# Patient Record
Sex: MALE | Race: WHITE | NOT HISPANIC OR LATINO | Employment: FULL TIME | URBAN - METROPOLITAN AREA
[De-identification: names, ages, dates, MRNs, and addresses within clinical notes are randomized per-mention and may not be internally consistent; named-entity substitution may affect disease eponyms.]

---

## 2024-09-13 ENCOUNTER — OFFICE VISIT (OUTPATIENT)
Dept: URGENT CARE | Age: 59
End: 2024-09-13
Payer: COMMERCIAL

## 2024-09-13 VITALS
DIASTOLIC BLOOD PRESSURE: 75 MMHG | SYSTOLIC BLOOD PRESSURE: 106 MMHG | OXYGEN SATURATION: 95 % | RESPIRATION RATE: 16 BRPM | HEART RATE: 60 BPM

## 2024-09-13 DIAGNOSIS — L23.7 ALLERGIC CONTACT DERMATITIS DUE TO PLANTS, EXCEPT FOOD: Primary | ICD-10-CM

## 2024-09-13 RX ORDER — TRIAMCINOLONE ACETONIDE 1 MG/G
CREAM TOPICAL
Qty: 30 G | Refills: 0 | Status: SHIPPED | OUTPATIENT
Start: 2024-09-13 | End: 2024-11-12

## 2024-09-13 RX ORDER — PREDNISONE 10 MG/1
TABLET ORAL
Qty: 42 TABLET | Refills: 0 | Status: SHIPPED | OUTPATIENT
Start: 2024-09-13 | End: 2024-09-25

## 2024-09-13 NOTE — PATIENT INSTRUCTIONS
Allergic contact dermatitis to plants/poison ivy (RHUS Dermatitis)    PLAN:  Symptomatic Treatment (sooth and dry weeping lesions)    1. You can try a cool compresses for 15-20 minutes per hour as needed.  2. Use topical steroid as directed. Steroid cream.  Do not apply to thin- skinned areas such as the eyelids or face.  3. You can try Calamine lotion applied several times per day  4. Take prednisone as prescribed.  5. Follow up with PCP in 7-10 days for reevaluation if no improvement.  6. Return to emergency department or urgent care if new or worsening                    symptoms    You can use Burow's Solution over the lesions  You can take Antihistamine for itch such as Benadryl, or Zyrtec/Claritin    You were seen for Allergic Contact dermatitis´s. This type of dermatitis is a reaction by the body's immune system to a substance contacting the skin. When the skin first comes into contact with the substance, the skin becomes sensitized to that substance. Sometimes a person can be sensitized by only one exposure, and other times sensitization occurs only after many exposures to a substance. After a person is sensitized, the next exposure causes intense itching and dermatitis within 4 to 24 hours, although some people, particularly older people, do not develop a reaction for 3 to 4 days. Thousands of substances can result in allergic contact dermatitis. The most common include substances found inPlants (such as poison ivy)Rubber (including latex) Antibiotics, Fragrances, Preservatives, Some metals (such as nickel and cobalt)    Poison Ivy Dermatitis  About 50 to 70% of people are sensitive to the plant oil urushiol contained in poison ivy, poison oak, and poison sumac. Similar oils are also present in the shells of cashew nuts; the leaves, sap, and fruit skin of the stefanie; and Japanese lacquer. Once a person has been sensitized by contact with these oils, subsequent exposure causes a contact dermatitis. The oils  are quickly absorbed into the skin but may remain active on clothing, tools, and pet fur for long periods of time. Smoke from burning plants also contains the oil and may cause a reaction in certain people. Symptoms of poison ivy dermatitis begin 8 to 48 hours after contact and consist of intense itching, a red rash, and multiple blisters, which may be tiny or very large. Typically, the blisters occur in a straight line following the track where the plant brushed along the skin. The rash may appear at different times in different locations either because of repeat contact with contaminated clothing and other objects or because some parts of the skin are more sensitive than others. The blister fluid itself is not contagious. The itching and rash last for 2 to 3 weeks. Recognition and avoidance of contact with the plants is the best prevention. A number of commercial barrier creams and lotions can be applied before exposure to minimize, but not completely prevent, absorption of oil by the skin. The oil can soak through latex rubber gloves. Washing the skin with soap and water prevents absorption of the oil if done immediately. Stronger solvents, such as acetone, alcohol, and various commercial products, are probably no more effective. Desensitization with various shots or pills or by eating poison ivy leaves is not effective. Treatment of poison ivy dermatitis helps relieve symptoms but does not shorten the duration of the rash. The most effective treatment is with corticosteroids. Small areas of rash are treated with strong topical corticosteroids (drugs applied to the skin), such as triamcinolone, clobetasol, or diflorasone-except on the face and genitals, where only mild corticosteroids, such as 1% hydrocortisone, should be applied. People with large areas of rash or significant facial swelling are given high-dose corticosteroids taken by mouth. Cool compresses wet with water or aluminum acetate may be used on  large blistered areas. Antihistamines given by mouth may help with itching. Lotions and creams containing antihistamines are seldom used.

## 2024-09-13 NOTE — PROGRESS NOTES
Subjective   Patient ID: Morgan Sullivan is a 59 y.o. male. They present today with a chief complaint of Rash.    History of Present Illness  HPI    CC: Rash    HPI: Patient presenting for pruritic rash of his upper right extremity and lower bilateral extremities.  Symptoms started a few days ago after clearing brush out of his yard.  No lip or tongue swelling.  No nausea or vomiting.  No open wounds or streaking redness.  No pain.  No weeping or discharge.  No recent chemical exposures.  No recent antibiotic use.    Past Medical History  Allergies as of 09/13/2024    (No Known Allergies)       (Not in a hospital admission)         History reviewed. No pertinent past medical history.    Past Surgical History:   Procedure Laterality Date    TONSILLECTOMY  10/02/2015    Tonsillectomy        reports that he has never smoked. He has never used smokeless tobacco.    Review of Systems  Review of Systems      After reviewing all body systems I have documented pertinent findings above in the history.  All other Systems reviewed and are negative for complaint.  Pertinent positive and negatives are listed in the above HPI.        Objective    Vitals:    09/13/24 1228   BP: 106/75   BP Location: Right arm   Pulse: 60   Resp: 16   SpO2: 95%     No LMP for male patient.    Physical Exam    General: Alert, oriented, and cooperative.  No acute distress.  No tripoding, nasal flaring, drooling, or stridor. Well developed, well nourished.     Skin: Positive for maculopapular rash in a linear formation localized over the right arm and bilateral lower extremities.  Skin is warm, and dry.  Nailbeds pink with no cyanosis or clubbing. No busies, purpura or petechiae. No lymphangitis, angioedema, edema, erythema, warmth, pain, necrosis, bullae, or modeling. No fluctuance, induration or crepitus. Negative Nikolsky sign. No pain out of proportion.     Eyes: Sclera and conjunctivae normal     Mouth/Throat: No respiratory compromise,  tripoding, drooling, muffled voice, stridor, or trismus.  No strawberry tongue.  No angioedema of the lips or tongue.    Cardiac: Regular rate    Respiratory:  No acute respiratory distress.  Regular rate of breathing.      Abdomen: Bowel sounds are normal.  No distention.  Soft, nontender.        Procedures    Point of Care Test & Imaging Results from this visit  No results found for this or any previous visit.  No results found.    Diagnostic study results (if any) were reviewed by Brijesh Deras PA-C.    Assessment/Plan   Allergies, medications, history, and pertinent labs/EKGs/Imaging reviewed by Brijesh Deras PA-C.       MDM:  Presenting for a nonpainful, pruritic rash.      Patient overall looks well.    Does not appear systemically ill or toxic.   Findings positive for a papulovesicular rash in a linear formation.   No evidence of cellulitis, abscess, or ocular involvement. Viral xanthem and medication reaction are unlikely. Based on above history and exam findings the most likely diagnosis is allergic contact dermatitis to poison ivy / rhus dermatitis.   Patient treated with steroids as outpatient therapy.   Counseled patient/family of the diagnosis and advised close follow up with PCP in 4-7 days.     Pt/family instructed to return to the ED if symptoms worsen or if new symptoms develop.         Medical Admin Record      Follow Up Instructions  No follow-ups on file.        Patient disposition: Home    Electronically signed by Brijesh Deras PA-C  12:49 PM

## 2025-07-12 ENCOUNTER — OFFICE VISIT (OUTPATIENT)
Dept: URGENT CARE | Age: 60
End: 2025-07-12
Payer: COMMERCIAL

## 2025-07-12 VITALS
HEART RATE: 70 BPM | RESPIRATION RATE: 16 BRPM | TEMPERATURE: 97.6 F | OXYGEN SATURATION: 98 % | SYSTOLIC BLOOD PRESSURE: 117 MMHG | DIASTOLIC BLOOD PRESSURE: 85 MMHG

## 2025-07-12 DIAGNOSIS — L23.9 ALLERGIC CONTACT DERMATITIS, UNSPECIFIED TRIGGER: Primary | ICD-10-CM

## 2025-07-12 PROCEDURE — 99070 SPECIAL SUPPLIES PHYS/QHP: CPT | Performed by: STUDENT IN AN ORGANIZED HEALTH CARE EDUCATION/TRAINING PROGRAM

## 2025-07-12 PROCEDURE — 99213 OFFICE O/P EST LOW 20 MIN: CPT | Performed by: STUDENT IN AN ORGANIZED HEALTH CARE EDUCATION/TRAINING PROGRAM

## 2025-07-12 RX ORDER — METHYLPREDNISOLONE 4 MG/1
TABLET ORAL
Qty: 21 TABLET | Refills: 0 | Status: SHIPPED | OUTPATIENT
Start: 2025-07-12 | End: 2025-07-18

## 2025-07-12 RX ORDER — TAMSULOSIN HYDROCHLORIDE 0.4 MG/1
0.4 CAPSULE ORAL DAILY
COMMUNITY

## 2025-07-12 RX ORDER — ESCITALOPRAM OXALATE 5 MG/5ML
10 SOLUTION ORAL DAILY
COMMUNITY

## 2025-07-12 RX ORDER — OXYBUTYNIN CHLORIDE 10 MG/1
10 TABLET, EXTENDED RELEASE ORAL DAILY
COMMUNITY

## 2025-07-12 RX ORDER — AMLODIPINE BESYLATE 2.5 MG/1
2.5 TABLET ORAL DAILY
COMMUNITY

## 2025-07-12 NOTE — PROGRESS NOTES
Subjective   Patient ID: Morgan Sullivan is a 60 y.o. male. They present today with a chief complaint of Rash (On legs x 1 week).    History of Present Illness  Morgan Is a pleasant  60-year-old male who presents to the urgent care for evaluation of diffuse red itchy rash on both shins and now spreading to hands and face that presented about a week ago.  Patient admits to [employer] for [employer] and shortly after developed this rash after sitting in the grass.  Patient does not recall exact known exposure to poison ivy however states has had this happen in the past similar to this.  Patient denies throat tingling, difficulty breathing or any abdominal symptoms or fever.  Patient is seeking evaluation reassurance and treatment options given diffuse nature of the rash.  No other symptoms or concerns otherwise reported.    Past Medical History  Allergies as of 07/12/2025    (No Known Allergies)       Prescriptions Prior to Admission[1]     Medical History[2]    Surgical History[3]     reports that he has never smoked. He has never used smokeless tobacco.    Review of Systems  A 10-point review of systems was performed, otherwise unremarkable unless stated in the history of present illness.              Objective    Vitals:    07/12/25 1253   BP: 117/85   Pulse: 70   Resp: 16   Temp: 36.4 °C (97.6 °F)   SpO2: 98%     No LMP for male patient.    Gen: Vitals noted and reviewed, no evidence of acute distress, well developed and afebrile.   Psych: Mood and affect appropriate for setting.  Head/Face: Atraumatic and normocephalic.   Neuro: No focal deficits noted.  Mouth/Throat: No evidence of angioedema, lesions, erythema.  Posterior oropharynx without erythema, exudate, or swelling. Uvula is in the midline and non-edematous.   Neck: Supple. No meningismus through full range of motion.   Cardiac: Regular rate and rhythm no murmur.   Lungs: Clear to auscultation throughout, No evidence of wheezing, rhonchi  or crackles. Good aeration throughout.   Abdomen: Soft, non-tender throughout. Normoactive bowel sounds. No evidence of palpable masses    Extremities: Symmetrical, No peripheral edema  Skin: Diffuse erythematous maculopapular rash on both anterior shins bilaterally and a few isolated maculopapular erythematous lesions on bilateral forearms.  Some questionable clustered small raised papules without evidence of crusting, discharge, ecchymosis, open wounds, vesicles, pustules or petechiae.      Point of Care Test & Imaging Results from this visit  No results found for this visit on 07/12/25.   Imaging  No results found.    Cardiology, Vascular, and Other Imaging  No other imaging results found for the past 2 days      Diagnostic study results (if any) were reviewed by Pepper Prajapati DO.    Assessment/Plan   Allergies, medications, history, and pertinent labs/EKGs/Imaging reviewed by Pepper Prajapati DO.     Medical Decision Making  Discussed with the patient symptoms and clinical presentation findings suggestive of a contact dermatitis as diffuse of unclear trigger which may be secondary to allergies though not definitive.  We advise close symptom monitoring supportive treatment and use of over-the-counter remedies to aid in symptom management.  Given the extensive nature of the rash and spread we agreed to initiate treatment with systemic corticosteroids and prescribed a Medrol Dosepak for this.  Advise close monitoring of symptoms and reviewed red flag symptoms of allergic reactions and to seek immediate [facility] medical attention if these develop. Follow up with Primary Care Provider. We advised seeking immediate emergency medical attention if symptoms fail to improve, worsen or any concerning symptoms arise. Patient voiced full understanding and agreement to plan.      Orders and Diagnoses  Diagnoses and all orders for this visit:  Allergic contact dermatitis, unspecified trigger  -     methylPREDNISolone (Medrol Dospak)  4 mg tablets; Follow schedule on package instructions. Take with food. Do NOT take with NSAIDs      Medical Admin Record      Patient disposition: Home    Electronically signed by Pepper Prajapati DO  1:09 PM           [1] (Not in a hospital admission)   [2] No past medical history on file.  [3]   Past Surgical History:  Procedure Laterality Date    TONSILLECTOMY  10/02/2015    Tonsillectomy